# Patient Record
Sex: MALE | Race: WHITE | NOT HISPANIC OR LATINO | URBAN - METROPOLITAN AREA
[De-identification: names, ages, dates, MRNs, and addresses within clinical notes are randomized per-mention and may not be internally consistent; named-entity substitution may affect disease eponyms.]

---

## 2018-01-05 ENCOUNTER — OUTPATIENT (OUTPATIENT)
Dept: OUTPATIENT SERVICES | Facility: HOSPITAL | Age: 23
LOS: 1 days | End: 2018-01-05

## 2018-01-05 VITALS
DIASTOLIC BLOOD PRESSURE: 84 MMHG | WEIGHT: 177.91 LBS | TEMPERATURE: 97 F | RESPIRATION RATE: 16 BRPM | HEIGHT: 70 IN | HEART RATE: 64 BPM | SYSTOLIC BLOOD PRESSURE: 124 MMHG

## 2018-01-05 DIAGNOSIS — S61.411A LACERATION WITHOUT FOREIGN BODY OF RIGHT HAND, INITIAL ENCOUNTER: ICD-10-CM

## 2018-01-05 NOTE — H&P PST ADULT - NSANTHOSAYNRD_GEN_A_CORE
No. FRANCIS screening performed.  STOP BANG Legend: 0-2 = LOW Risk; 3-4 = INTERMEDIATE Risk; 5-8 = HIGH Risk

## 2018-01-05 NOTE — H&P PST ADULT - HISTORY OF PRESENT ILLNESS
22 year old male s/p laceration to right hand with repair about 1.5 months ago. Pt with limited ROM to right 4th and 5th digits and decreased sensation to lateral 5th digit. Pt presents today for presurgical evaluation for .. 22 year old male s/p laceration to right hand with repair about 1.5 months ago. Pt with limited ROM to right 4th and 5th digits and decreased sensation to lateral 5th digit. Pt presents today for presurgical evaluation for Right Ring and Little Finger Exploration Possible Flexor Tendon Repair scheduled on 1/8/17.

## 2018-01-05 NOTE — H&P PST ADULT - NEGATIVE ENMT SYMPTOMS
no dysphagia/no vertigo/no hearing difficulty/no ear pain/no throat pain/no tinnitus/no sinus symptoms

## 2018-01-05 NOTE — H&P PST ADULT - PROBLEM SELECTOR PLAN 1
Right Ring and Little Finger Exploration Possible Flexor Tendon Repair scheduled on 1/8/17.   Pre-op instructions provided. Pt verbalized understanding.   Pepcid provided for GI prophylaxis.

## 2018-01-05 NOTE — H&P PST ADULT - VISION (WITH CORRECTIVE LENSES IF THE PATIENT USUALLY WEARS THEM):
Normal vision: sees adequately in most situations; can see medication labels, newsprint/glasses/contacts

## 2018-01-08 ENCOUNTER — OUTPATIENT (OUTPATIENT)
Dept: OUTPATIENT SERVICES | Facility: HOSPITAL | Age: 23
LOS: 1 days | Discharge: HOME | End: 2018-01-08

## 2018-01-08 VITALS
TEMPERATURE: 98 F | WEIGHT: 177.91 LBS | DIASTOLIC BLOOD PRESSURE: 68 MMHG | HEART RATE: 70 BPM | RESPIRATION RATE: 16 BRPM | OXYGEN SATURATION: 100 % | SYSTOLIC BLOOD PRESSURE: 127 MMHG | HEIGHT: 70 IN

## 2018-01-08 VITALS
DIASTOLIC BLOOD PRESSURE: 84 MMHG | HEART RATE: 70 BPM | SYSTOLIC BLOOD PRESSURE: 120 MMHG | OXYGEN SATURATION: 100 % | RESPIRATION RATE: 15 BRPM

## 2018-01-08 DIAGNOSIS — S61.411A LACERATION WITHOUT FOREIGN BODY OF RIGHT HAND, INITIAL ENCOUNTER: ICD-10-CM

## 2018-01-08 NOTE — ASU DISCHARGE PLAN (ADULT/PEDIATRIC). - NOTIFY
Persistent Nausea and Vomiting/Fever greater than 101/Pain not relieved by Medications/Numbness, color, or temperature change to extremity/Bleeding that does not stop

## 2018-01-08 NOTE — ASU DISCHARGE PLAN (ADULT/PEDIATRIC). - ASU FOLLOWUP
[facility] ([facility]):

## 2018-01-08 NOTE — ASU DISCHARGE PLAN (ADULT/PEDIATRIC). - NURSING INSTRUCTIONS
Resume normal diet as tolerated. Keep hand dressing clean, dry and intact.  Keep arm elevated above level of heart, and keep hand in the position Dr. Mathis put hand in.  Make follow up appointment with  Call office with any questions or problems.

## 2020-09-11 ENCOUNTER — ED HISTORICAL/CONVERTED ENCOUNTER (OUTPATIENT)
Dept: OTHER | Age: 25
End: 2020-09-11

## 2021-03-22 NOTE — H&P PST ADULT - RS GEN PE MLT RESP DETAILS PC
respirations non-labored/clear to auscultation bilaterally/airway patent/good air movement/breath sounds equal detailed exam